# Patient Record
Sex: MALE | Race: WHITE | NOT HISPANIC OR LATINO | URBAN - METROPOLITAN AREA
[De-identification: names, ages, dates, MRNs, and addresses within clinical notes are randomized per-mention and may not be internally consistent; named-entity substitution may affect disease eponyms.]

---

## 2019-11-28 ENCOUNTER — INPATIENT (INPATIENT)
Facility: HOSPITAL | Age: 39
LOS: 4 days | Discharge: HOME | End: 2019-12-03
Attending: SURGERY | Admitting: SURGERY
Payer: MEDICAID

## 2019-11-28 VITALS
DIASTOLIC BLOOD PRESSURE: 69 MMHG | WEIGHT: 214.95 LBS | OXYGEN SATURATION: 100 % | HEIGHT: 69 IN | HEART RATE: 87 BPM | TEMPERATURE: 98 F | SYSTOLIC BLOOD PRESSURE: 131 MMHG | RESPIRATION RATE: 17 BRPM

## 2019-11-28 LAB
ALBUMIN SERPL ELPH-MCNC: 4.9 G/DL — SIGNIFICANT CHANGE UP (ref 3.3–5)
ALP SERPL-CCNC: 69 U/L — SIGNIFICANT CHANGE UP (ref 40–120)
ALT FLD-CCNC: 38 U/L — SIGNIFICANT CHANGE UP (ref 4–41)
ANION GAP SERPL CALC-SCNC: 16 MMO/L — HIGH (ref 7–14)
APTT BLD: 31.3 SEC — SIGNIFICANT CHANGE UP (ref 27.5–36.3)
AST SERPL-CCNC: 33 U/L — SIGNIFICANT CHANGE UP (ref 4–40)
BASE EXCESS BLDV CALC-SCNC: 2.7 MMOL/L — SIGNIFICANT CHANGE UP
BASOPHILS # BLD AUTO: 0.03 K/UL — SIGNIFICANT CHANGE UP (ref 0–0.2)
BASOPHILS NFR BLD AUTO: 0.2 % — SIGNIFICANT CHANGE UP (ref 0–2)
BILIRUB SERPL-MCNC: 0.7 MG/DL — SIGNIFICANT CHANGE UP (ref 0.2–1.2)
BLOOD GAS VENOUS - CREATININE: 1.12 MG/DL — SIGNIFICANT CHANGE UP (ref 0.5–1.3)
BLOOD GAS VENOUS - FIO2: 21 — SIGNIFICANT CHANGE UP
BUN SERPL-MCNC: 16 MG/DL — SIGNIFICANT CHANGE UP (ref 7–23)
CALCIUM SERPL-MCNC: 10.1 MG/DL — SIGNIFICANT CHANGE UP (ref 8.4–10.5)
CHLORIDE BLDV-SCNC: 100 MMOL/L — SIGNIFICANT CHANGE UP (ref 96–108)
CHLORIDE SERPL-SCNC: 99 MMOL/L — SIGNIFICANT CHANGE UP (ref 98–107)
CO2 SERPL-SCNC: 24 MMOL/L — SIGNIFICANT CHANGE UP (ref 22–31)
CREAT SERPL-MCNC: 1.04 MG/DL — SIGNIFICANT CHANGE UP (ref 0.5–1.3)
EOSINOPHIL # BLD AUTO: 0.01 K/UL — SIGNIFICANT CHANGE UP (ref 0–0.5)
EOSINOPHIL NFR BLD AUTO: 0.1 % — SIGNIFICANT CHANGE UP (ref 0–6)
GAS PNL BLDV: 136 MMOL/L — SIGNIFICANT CHANGE UP (ref 136–146)
GLUCOSE BLDV-MCNC: 142 MG/DL — HIGH (ref 70–99)
GLUCOSE SERPL-MCNC: 147 MG/DL — HIGH (ref 70–99)
HCO3 BLDV-SCNC: 24 MMOL/L — SIGNIFICANT CHANGE UP (ref 20–27)
HCT VFR BLD CALC: 48.6 % — SIGNIFICANT CHANGE UP (ref 39–50)
HCT VFR BLDV CALC: 52.1 % — HIGH (ref 39–51)
HGB BLD-MCNC: 17 G/DL — SIGNIFICANT CHANGE UP (ref 13–17)
HGB BLDV-MCNC: 17 G/DL — SIGNIFICANT CHANGE UP (ref 13–17)
IMM GRANULOCYTES NFR BLD AUTO: 0.5 % — SIGNIFICANT CHANGE UP (ref 0–1.5)
INR BLD: 1.11 — SIGNIFICANT CHANGE UP (ref 0.88–1.17)
LACTATE BLDV-MCNC: 3.8 MMOL/L — HIGH (ref 0.5–2)
LIDOCAIN IGE QN: 15.6 U/L — SIGNIFICANT CHANGE UP (ref 7–60)
LYMPHOCYTES # BLD AUTO: 0.8 K/UL — LOW (ref 1–3.3)
LYMPHOCYTES # BLD AUTO: 5.4 % — LOW (ref 13–44)
MCHC RBC-ENTMCNC: 31.4 PG — SIGNIFICANT CHANGE UP (ref 27–34)
MCHC RBC-ENTMCNC: 35 % — SIGNIFICANT CHANGE UP (ref 32–36)
MCV RBC AUTO: 89.7 FL — SIGNIFICANT CHANGE UP (ref 80–100)
MONOCYTES # BLD AUTO: 0.67 K/UL — SIGNIFICANT CHANGE UP (ref 0–0.9)
MONOCYTES NFR BLD AUTO: 4.5 % — SIGNIFICANT CHANGE UP (ref 2–14)
NEUTROPHILS # BLD AUTO: 13.26 K/UL — HIGH (ref 1.8–7.4)
NEUTROPHILS NFR BLD AUTO: 89.3 % — HIGH (ref 43–77)
NRBC # FLD: 0 K/UL — SIGNIFICANT CHANGE UP (ref 0–0)
PCO2 BLDV: 44 MMHG — SIGNIFICANT CHANGE UP (ref 41–51)
PH BLDV: 7.41 PH — SIGNIFICANT CHANGE UP (ref 7.32–7.43)
PLATELET # BLD AUTO: 389 K/UL — SIGNIFICANT CHANGE UP (ref 150–400)
PMV BLD: 9.6 FL — SIGNIFICANT CHANGE UP (ref 7–13)
PO2 BLDV: < 24 MMHG — LOW (ref 35–40)
POTASSIUM BLDV-SCNC: 4.2 MMOL/L — SIGNIFICANT CHANGE UP (ref 3.4–4.5)
POTASSIUM SERPL-MCNC: 4.8 MMOL/L — SIGNIFICANT CHANGE UP (ref 3.5–5.3)
POTASSIUM SERPL-SCNC: 4.8 MMOL/L — SIGNIFICANT CHANGE UP (ref 3.5–5.3)
PROT SERPL-MCNC: 8.5 G/DL — HIGH (ref 6–8.3)
PROTHROM AB SERPL-ACNC: 12.7 SEC — SIGNIFICANT CHANGE UP (ref 9.8–13.1)
RBC # BLD: 5.42 M/UL — SIGNIFICANT CHANGE UP (ref 4.2–5.8)
RBC # FLD: 12.3 % — SIGNIFICANT CHANGE UP (ref 10.3–14.5)
SAO2 % BLDV: 19.5 % — LOW (ref 60–85)
SODIUM SERPL-SCNC: 139 MMOL/L — SIGNIFICANT CHANGE UP (ref 135–145)
WBC # BLD: 14.84 K/UL — HIGH (ref 3.8–10.5)
WBC # FLD AUTO: 14.84 K/UL — HIGH (ref 3.8–10.5)

## 2019-11-28 PROCEDURE — 74019 RADEX ABDOMEN 2 VIEWS: CPT | Mod: 26

## 2019-11-28 RX ORDER — ACETAMINOPHEN 500 MG
975 TABLET ORAL ONCE
Refills: 0 | Status: COMPLETED | OUTPATIENT
Start: 2019-11-28 | End: 2019-11-28

## 2019-11-28 RX ORDER — SODIUM CHLORIDE 9 MG/ML
1000 INJECTION, SOLUTION INTRAVENOUS ONCE
Refills: 0 | Status: COMPLETED | OUTPATIENT
Start: 2019-11-28 | End: 2019-11-28

## 2019-11-28 RX ORDER — MORPHINE SULFATE 50 MG/1
4 CAPSULE, EXTENDED RELEASE ORAL ONCE
Refills: 0 | Status: DISCONTINUED | OUTPATIENT
Start: 2019-11-28 | End: 2019-11-28

## 2019-11-28 RX ORDER — PIPERACILLIN AND TAZOBACTAM 4; .5 G/20ML; G/20ML
3.38 INJECTION, POWDER, LYOPHILIZED, FOR SOLUTION INTRAVENOUS ONCE
Refills: 0 | Status: COMPLETED | OUTPATIENT
Start: 2019-11-28 | End: 2019-11-28

## 2019-11-28 RX ADMIN — Medication 975 MILLIGRAM(S): at 22:43

## 2019-11-28 RX ADMIN — SODIUM CHLORIDE 1000 MILLILITER(S): 9 INJECTION, SOLUTION INTRAVENOUS at 23:40

## 2019-11-28 RX ADMIN — MORPHINE SULFATE 4 MILLIGRAM(S): 50 CAPSULE, EXTENDED RELEASE ORAL at 22:43

## 2019-11-28 RX ADMIN — SODIUM CHLORIDE 1000 MILLILITER(S): 9 INJECTION, SOLUTION INTRAVENOUS at 22:43

## 2019-11-28 NOTE — ED ADULT NURSE NOTE - OBJECTIVE STATEMENT
Pt received in rm #3, 39Y M Aox3, ambulatory c/o intermittent abd pain severity 10/10 since sunday. Pt reports episodes of n/v. Pt denies any cp, sob, dizziness. Pt rectally 103.2F upon assessment, VS as charted. IV 18G Rt AC, labs sent, medicated as ordered. Pt respirations even and unlabored b/l. Pt appears in NAD, family at bedside, will continue to monitor.

## 2019-11-28 NOTE — ED PROVIDER NOTE - OBJECTIVE STATEMENT
39M no pmh no prior abdominal surgeries presents with a cc of lower abdominal pain a/w nausea, chills, firm bowel movements? no bleeding a/w radiation to tip of penis, got worse this evening in setting of having a bowel movement, no pain meds prior to ED arrival. Able to pass urine. Pain non radiating. Denies /v/f/cp/sob. Denies headache, syncope, lightheadedness, dizziness. Denies chest palpitations, abdominal pain. Denies dysuria, hematuria, hematochezia, BRBPR, tarry stools, diarrhea,

## 2019-11-28 NOTE — ED PROVIDER NOTE - CLINICAL SUMMARY MEDICAL DECISION MAKING FREE TEXT BOX
Ban PGY3: 39M no pmh no prior abdominal surgeries presents with a cc of lower abdominal pain a/w nausea, chills, firm bowel movements? no bleeding a/w radiation to tip of penis, got worse this evening in setting of having a bowel movement, no pain meds prior to ED arrival exam vss febrile 103.5 rectally, diffuse lower abdominal ttp c/f appendicitis vs colitis vs diverticulitis vs less likely uti will get labs xr abdomen ctap give apap ivf, abx as indicated, reassess

## 2019-11-28 NOTE — ED PROVIDER NOTE - PHYSICAL EXAMINATION
GEN APPEARANCE: WDWN, alert and cooperative, non-toxic appearing, warm to touch, uncomfortable appearing   HEAD: Atraumatic, normocephalic   EYES: PERRLa, EOMI, vision grossly intact.   EARS: Gross hearing intact.   NOSE: No nasal discharge, no external evidence of epistaxis.   NECK: Supple  CV: RRR, S1S2, no c/r/m/g. No cyanosis or pallor. Extremities warm, well perfused. Cap refill <2 seconds. No bruits.   LUNGS: CTAB. No wheezing. No rales. No rhonchi. No diminished breath sounds.   ABDOMEN: diffuse lower abdominal ttp.   MSK: Spine appears normal, no spine point tenderness. No CVA ttp. No joint erythema or tenderness. Normal muscular development. Pelvis stable.  EXTREMITIES: No peripheral edema. No obvious joint or bony deformity.  NEURO: Alert, follows commands. Weight bearing normal. Speech normal. Sensation and motor normal x4 extremities.   SKIN: Normal color for race, warm, dry and intact. No evidence of rash.  PSYCH: Normal mood and affect.   Exam (Male): Un-Circumcised penis, external anatomy appears normal, no e/o priapism, no e/o trauma. No testicular swelling, erythema, or tenderness. No urethral discharge or bleeding. No hernia.   EXAM WITH: Tito SMITH

## 2019-11-28 NOTE — ED PROVIDER NOTE - ATTENDING CONTRIBUTION TO CARE
40yo M with no past medical or surgical hx p/w 4-5 days of lower abd pain, nausea, dysuria at times, and scant soft stools.  Found to be febrile to 103F in ED.  No testicular pain    General: Patient in no apparent distress, AAO x 3  Skin: Dry and intact  HEENT: Oral mucosa moist. No pharyngeal exudates or tonsillar enlargement  Eyes: Conjunctiva normal  Cardiac: Regular rhythm and rate. No edema  Respiratory: Lungs clear b/l and symmetric. No respiratory distress  Gastrointestinal: Abdomen soft, nondistended, lower abdominal ttp with guarding, no inguinal hernias b/l, no testicular ttp.   Musculoskeletal: Moves all extremities spontaneously  Neurological: alert and oriented to person, place and time  Psychiatric: Cooperative    a/p  concern for intra-abdominal infection with fever and exam  pre-op labs, IV zosyn, CT abd, pain meds, ua

## 2019-11-28 NOTE — ED ADULT TRIAGE NOTE - CHIEF COMPLAINT QUOTE
Pt arrives to ED  from home c/o lower abd pain above pubic bone with N/V since Sunday which has been worsening.  Pt c/o constipation since Sunday with very little stool during attempted bowel movements.  Pt denies  issues until today and states he is not urinating as much as usual.  Pain feels as if pain is "stabbing" and comes in waves.  Pt appears uncomfortable in triage.

## 2019-11-29 DIAGNOSIS — K57.20 DIVERTICULITIS OF LARGE INTESTINE WITH PERFORATION AND ABSCESS WITHOUT BLEEDING: ICD-10-CM

## 2019-11-29 LAB
ANION GAP SERPL CALC-SCNC: 11 MMO/L — SIGNIFICANT CHANGE UP (ref 7–14)
APPEARANCE UR: CLEAR — SIGNIFICANT CHANGE UP
BACTERIA # UR AUTO: NEGATIVE — SIGNIFICANT CHANGE UP
BASE EXCESS BLDV CALC-SCNC: -0.1 MMOL/L — SIGNIFICANT CHANGE UP
BILIRUB UR-MCNC: NEGATIVE — SIGNIFICANT CHANGE UP
BLD GP AB SCN SERPL QL: NEGATIVE — SIGNIFICANT CHANGE UP
BLD GP AB SCN SERPL QL: NEGATIVE — SIGNIFICANT CHANGE UP
BLOOD GAS VENOUS - CREATININE: 1.08 MG/DL — SIGNIFICANT CHANGE UP (ref 0.5–1.3)
BLOOD GAS VENOUS - FIO2: 21 — SIGNIFICANT CHANGE UP
BLOOD UR QL VISUAL: NEGATIVE — SIGNIFICANT CHANGE UP
BUN SERPL-MCNC: 13 MG/DL — SIGNIFICANT CHANGE UP (ref 7–23)
CALCIUM SERPL-MCNC: 9.1 MG/DL — SIGNIFICANT CHANGE UP (ref 8.4–10.5)
CHLORIDE BLDV-SCNC: 103 MMOL/L — SIGNIFICANT CHANGE UP (ref 96–108)
CHLORIDE SERPL-SCNC: 101 MMOL/L — SIGNIFICANT CHANGE UP (ref 98–107)
CO2 SERPL-SCNC: 24 MMOL/L — SIGNIFICANT CHANGE UP (ref 22–31)
COLOR SPEC: YELLOW — SIGNIFICANT CHANGE UP
CREAT SERPL-MCNC: 1 MG/DL — SIGNIFICANT CHANGE UP (ref 0.5–1.3)
GAS PNL BLDV: 136 MMOL/L — SIGNIFICANT CHANGE UP (ref 136–146)
GLUCOSE BLDV-MCNC: 124 MG/DL — HIGH (ref 70–99)
GLUCOSE SERPL-MCNC: 120 MG/DL — HIGH (ref 70–99)
GLUCOSE UR-MCNC: NEGATIVE — SIGNIFICANT CHANGE UP
HCO3 BLDV-SCNC: 25 MMOL/L — SIGNIFICANT CHANGE UP (ref 20–27)
HCT VFR BLD CALC: 41 % — SIGNIFICANT CHANGE UP (ref 39–50)
HCT VFR BLDV CALC: 46.5 % — SIGNIFICANT CHANGE UP (ref 39–51)
HGB BLD-MCNC: 14.1 G/DL — SIGNIFICANT CHANGE UP (ref 13–17)
HGB BLDV-MCNC: 15.2 G/DL — SIGNIFICANT CHANGE UP (ref 13–17)
HYALINE CASTS # UR AUTO: SIGNIFICANT CHANGE UP
KETONES UR-MCNC: SIGNIFICANT CHANGE UP
LACTATE BLDV-MCNC: 2.6 MMOL/L — HIGH (ref 0.5–2)
LEUKOCYTE ESTERASE UR-ACNC: NEGATIVE — SIGNIFICANT CHANGE UP
MAGNESIUM SERPL-MCNC: 1.8 MG/DL — SIGNIFICANT CHANGE UP (ref 1.6–2.6)
MCHC RBC-ENTMCNC: 30.7 PG — SIGNIFICANT CHANGE UP (ref 27–34)
MCHC RBC-ENTMCNC: 34.4 % — SIGNIFICANT CHANGE UP (ref 32–36)
MCV RBC AUTO: 89.1 FL — SIGNIFICANT CHANGE UP (ref 80–100)
NITRITE UR-MCNC: NEGATIVE — SIGNIFICANT CHANGE UP
NRBC # FLD: 0 K/UL — SIGNIFICANT CHANGE UP (ref 0–0)
PCO2 BLDV: 36 MMHG — LOW (ref 41–51)
PH BLDV: 7.43 PH — SIGNIFICANT CHANGE UP (ref 7.32–7.43)
PH UR: 6.5 — SIGNIFICANT CHANGE UP (ref 5–8)
PHOSPHATE SERPL-MCNC: 3.4 MG/DL — SIGNIFICANT CHANGE UP (ref 2.5–4.5)
PLATELET # BLD AUTO: 319 K/UL — SIGNIFICANT CHANGE UP (ref 150–400)
PMV BLD: 10 FL — SIGNIFICANT CHANGE UP (ref 7–13)
PO2 BLDV: 109 MMHG — HIGH (ref 35–40)
POTASSIUM BLDV-SCNC: 4 MMOL/L — SIGNIFICANT CHANGE UP (ref 3.4–4.5)
POTASSIUM SERPL-MCNC: 3.9 MMOL/L — SIGNIFICANT CHANGE UP (ref 3.5–5.3)
POTASSIUM SERPL-SCNC: 3.9 MMOL/L — SIGNIFICANT CHANGE UP (ref 3.5–5.3)
PROT UR-MCNC: 50 — SIGNIFICANT CHANGE UP
RBC # BLD: 4.6 M/UL — SIGNIFICANT CHANGE UP (ref 4.2–5.8)
RBC # FLD: 12.7 % — SIGNIFICANT CHANGE UP (ref 10.3–14.5)
RBC CASTS # UR COMP ASSIST: SIGNIFICANT CHANGE UP (ref 0–?)
RH IG SCN BLD-IMP: POSITIVE — SIGNIFICANT CHANGE UP
RH IG SCN BLD-IMP: POSITIVE — SIGNIFICANT CHANGE UP
SAO2 % BLDV: 98.6 % — HIGH (ref 60–85)
SODIUM SERPL-SCNC: 136 MMOL/L — SIGNIFICANT CHANGE UP (ref 135–145)
SP GR SPEC: > 1.04 — HIGH (ref 1–1.04)
SQUAMOUS # UR AUTO: SIGNIFICANT CHANGE UP
UROBILINOGEN FLD QL: SIGNIFICANT CHANGE UP
WBC # BLD: 21.4 K/UL — HIGH (ref 3.8–10.5)
WBC # FLD AUTO: 21.4 K/UL — HIGH (ref 3.8–10.5)
WBC UR QL: SIGNIFICANT CHANGE UP (ref 0–?)

## 2019-11-29 PROCEDURE — 99232 SBSQ HOSP IP/OBS MODERATE 35: CPT

## 2019-11-29 PROCEDURE — 74177 CT ABD & PELVIS W/CONTRAST: CPT | Mod: 26

## 2019-11-29 RX ORDER — MORPHINE SULFATE 50 MG/1
4 CAPSULE, EXTENDED RELEASE ORAL ONCE
Refills: 0 | Status: DISCONTINUED | OUTPATIENT
Start: 2019-11-29 | End: 2019-11-29

## 2019-11-29 RX ORDER — ENOXAPARIN SODIUM 100 MG/ML
40 INJECTION SUBCUTANEOUS DAILY
Refills: 0 | Status: DISCONTINUED | OUTPATIENT
Start: 2019-11-29 | End: 2019-12-03

## 2019-11-29 RX ORDER — PIPERACILLIN AND TAZOBACTAM 4; .5 G/20ML; G/20ML
3.38 INJECTION, POWDER, LYOPHILIZED, FOR SOLUTION INTRAVENOUS EVERY 8 HOURS
Refills: 0 | Status: DISCONTINUED | OUTPATIENT
Start: 2019-11-29 | End: 2019-12-03

## 2019-11-29 RX ORDER — SODIUM CHLORIDE 9 MG/ML
1000 INJECTION, SOLUTION INTRAVENOUS ONCE
Refills: 0 | Status: COMPLETED | OUTPATIENT
Start: 2019-11-29 | End: 2019-11-29

## 2019-11-29 RX ORDER — PIPERACILLIN AND TAZOBACTAM 4; .5 G/20ML; G/20ML
3.38 INJECTION, POWDER, LYOPHILIZED, FOR SOLUTION INTRAVENOUS ONCE
Refills: 0 | Status: DISCONTINUED | OUTPATIENT
Start: 2019-11-29 | End: 2019-11-29

## 2019-11-29 RX ORDER — SODIUM CHLORIDE 9 MG/ML
1000 INJECTION, SOLUTION INTRAVENOUS
Refills: 0 | Status: DISCONTINUED | OUTPATIENT
Start: 2019-11-29 | End: 2019-11-29

## 2019-11-29 RX ORDER — HYDROMORPHONE HYDROCHLORIDE 2 MG/ML
0.5 INJECTION INTRAMUSCULAR; INTRAVENOUS; SUBCUTANEOUS EVERY 4 HOURS
Refills: 0 | Status: DISCONTINUED | OUTPATIENT
Start: 2019-11-29 | End: 2019-11-30

## 2019-11-29 RX ORDER — ACETAMINOPHEN 500 MG
1000 TABLET ORAL EVERY 6 HOURS
Refills: 0 | Status: COMPLETED | OUTPATIENT
Start: 2019-11-29 | End: 2019-11-29

## 2019-11-29 RX ORDER — SODIUM CHLORIDE 9 MG/ML
1000 INJECTION, SOLUTION INTRAVENOUS ONCE
Refills: 0 | Status: DISCONTINUED | OUTPATIENT
Start: 2019-11-29 | End: 2019-11-29

## 2019-11-29 RX ORDER — SODIUM CHLORIDE 9 MG/ML
500 INJECTION, SOLUTION INTRAVENOUS ONCE
Refills: 0 | Status: COMPLETED | OUTPATIENT
Start: 2019-11-29 | End: 2019-11-29

## 2019-11-29 RX ORDER — SODIUM CHLORIDE 9 MG/ML
1000 INJECTION, SOLUTION INTRAVENOUS
Refills: 0 | Status: DISCONTINUED | OUTPATIENT
Start: 2019-11-29 | End: 2019-11-30

## 2019-11-29 RX ORDER — MAGNESIUM SULFATE 500 MG/ML
2 VIAL (ML) INJECTION ONCE
Refills: 0 | Status: COMPLETED | OUTPATIENT
Start: 2019-11-29 | End: 2019-11-29

## 2019-11-29 RX ADMIN — Medication 400 MILLIGRAM(S): at 11:28

## 2019-11-29 RX ADMIN — HYDROMORPHONE HYDROCHLORIDE 0.5 MILLIGRAM(S): 2 INJECTION INTRAMUSCULAR; INTRAVENOUS; SUBCUTANEOUS at 22:55

## 2019-11-29 RX ADMIN — MORPHINE SULFATE 4 MILLIGRAM(S): 50 CAPSULE, EXTENDED RELEASE ORAL at 02:07

## 2019-11-29 RX ADMIN — HYDROMORPHONE HYDROCHLORIDE 0.5 MILLIGRAM(S): 2 INJECTION INTRAMUSCULAR; INTRAVENOUS; SUBCUTANEOUS at 12:54

## 2019-11-29 RX ADMIN — PIPERACILLIN AND TAZOBACTAM 25 GRAM(S): 4; .5 INJECTION, POWDER, LYOPHILIZED, FOR SOLUTION INTRAVENOUS at 15:19

## 2019-11-29 RX ADMIN — Medication 1000 MILLIGRAM(S): at 06:39

## 2019-11-29 RX ADMIN — PIPERACILLIN AND TAZOBACTAM 25 GRAM(S): 4; .5 INJECTION, POWDER, LYOPHILIZED, FOR SOLUTION INTRAVENOUS at 07:09

## 2019-11-29 RX ADMIN — HYDROMORPHONE HYDROCHLORIDE 0.5 MILLIGRAM(S): 2 INJECTION INTRAMUSCULAR; INTRAVENOUS; SUBCUTANEOUS at 09:01

## 2019-11-29 RX ADMIN — Medication 400 MILLIGRAM(S): at 05:51

## 2019-11-29 RX ADMIN — PIPERACILLIN AND TAZOBACTAM 3.38 GRAM(S): 4; .5 INJECTION, POWDER, LYOPHILIZED, FOR SOLUTION INTRAVENOUS at 00:40

## 2019-11-29 RX ADMIN — SODIUM CHLORIDE 500 MILLILITER(S): 9 INJECTION, SOLUTION INTRAVENOUS at 14:00

## 2019-11-29 RX ADMIN — HYDROMORPHONE HYDROCHLORIDE 0.5 MILLIGRAM(S): 2 INJECTION INTRAMUSCULAR; INTRAVENOUS; SUBCUTANEOUS at 13:24

## 2019-11-29 RX ADMIN — SODIUM CHLORIDE 1000 MILLILITER(S): 9 INJECTION, SOLUTION INTRAVENOUS at 01:46

## 2019-11-29 RX ADMIN — HYDROMORPHONE HYDROCHLORIDE 0.5 MILLIGRAM(S): 2 INJECTION INTRAMUSCULAR; INTRAVENOUS; SUBCUTANEOUS at 04:09

## 2019-11-29 RX ADMIN — Medication 975 MILLIGRAM(S): at 02:07

## 2019-11-29 RX ADMIN — PIPERACILLIN AND TAZOBACTAM 200 GRAM(S): 4; .5 INJECTION, POWDER, LYOPHILIZED, FOR SOLUTION INTRAVENOUS at 00:00

## 2019-11-29 RX ADMIN — HYDROMORPHONE HYDROCHLORIDE 0.5 MILLIGRAM(S): 2 INJECTION INTRAMUSCULAR; INTRAVENOUS; SUBCUTANEOUS at 08:30

## 2019-11-29 RX ADMIN — SODIUM CHLORIDE 1000 MILLILITER(S): 9 INJECTION, SOLUTION INTRAVENOUS at 02:40

## 2019-11-29 RX ADMIN — HYDROMORPHONE HYDROCHLORIDE 0.5 MILLIGRAM(S): 2 INJECTION INTRAMUSCULAR; INTRAVENOUS; SUBCUTANEOUS at 23:22

## 2019-11-29 RX ADMIN — Medication 1000 MILLIGRAM(S): at 12:58

## 2019-11-29 RX ADMIN — MORPHINE SULFATE 4 MILLIGRAM(S): 50 CAPSULE, EXTENDED RELEASE ORAL at 01:02

## 2019-11-29 RX ADMIN — HYDROMORPHONE HYDROCHLORIDE 0.5 MILLIGRAM(S): 2 INJECTION INTRAMUSCULAR; INTRAVENOUS; SUBCUTANEOUS at 19:27

## 2019-11-29 RX ADMIN — Medication 50 GRAM(S): at 12:56

## 2019-11-29 RX ADMIN — Medication 1000 MILLIGRAM(S): at 18:44

## 2019-11-29 RX ADMIN — Medication 400 MILLIGRAM(S): at 23:18

## 2019-11-29 RX ADMIN — Medication 1000 MILLIGRAM(S): at 23:48

## 2019-11-29 RX ADMIN — Medication 400 MILLIGRAM(S): at 18:14

## 2019-11-29 RX ADMIN — PIPERACILLIN AND TAZOBACTAM 25 GRAM(S): 4; .5 INJECTION, POWDER, LYOPHILIZED, FOR SOLUTION INTRAVENOUS at 23:19

## 2019-11-29 RX ADMIN — SODIUM CHLORIDE 135 MILLILITER(S): 9 INJECTION, SOLUTION INTRAVENOUS at 12:56

## 2019-11-29 RX ADMIN — SODIUM CHLORIDE 135 MILLILITER(S): 9 INJECTION, SOLUTION INTRAVENOUS at 04:09

## 2019-11-29 RX ADMIN — HYDROMORPHONE HYDROCHLORIDE 0.5 MILLIGRAM(S): 2 INJECTION INTRAMUSCULAR; INTRAVENOUS; SUBCUTANEOUS at 18:57

## 2019-11-29 NOTE — H&P ADULT - NSHPLABSRESULTS_GEN_ALL_CORE
CBC (11-28 @ 22:48)                          17.0                     14.84<H>  )--------------(  389        89.3<H>% Neuts, 5.4<L>% Lymphs, ANC: 13.26<H>                          48.6      BMP (11-28 @ 22:48)       139     |  99      |  16    			Ca++ --      Ca 10.1         ---------------------------------( 147<H>		Mg --           4.8     |  24      |  1.04  			Ph --        LFTs (11-28 @ 22:48)      TPro 8.5<H> / Alb 4.9 / TBili 0.7 / DBili -- / AST 33 / ALT 38 / AlkPhos 69    Coags (11-28 @ 22:48)  aPTT 31.3 / INR 1.11 / PT 12.7        VBG (11-29 @ 01:00)     7.43 / 36<L> / 109<H> / 25 / -0.1 / 98.6<H>%      Lactate: 2.6<H>  VBG (11-28 @ 22:48)     7.41 / 44 / < 24<L> / 24 / 2.7 / 19.5<L>%      Lactate: 3.8<H>    Urinalysis (11-29 @ 01:00):     Color: YELLOW / Appearance: CLEAR / SG: > 1.040<H> / pH: 6.5 / Gluc: NEGATIVE / Ketones: SMALL / Bili: NEGATIVE / Urobili: TRACE / Protein :50 / Nitrites: NEGATIVE / Leuk.Est: NEGATIVE / RBC: 3-5 / WBC: 3-5 / Sq Epi: OCC / Non Sq Epi:  / Bacteria NEGATIVE           CT A/P:  PROCEDURE:   CT of the Abdomen and Pelvis was performed with intravenous contrast.   Intravenous contrast: 90 ml Omnipaque 350. 10 ml discarded.  Oral contrast: None.  Sagittal and coronal reformats were performed.    FINDINGS:    LOWER CHEST: Subsegmental atelectasis. Mitral and calcification.    LIVER: Diffuse low-attenuation. No obvious lesion.  BILE DUCTS: Normal caliber.  GALLBLADDER: No significant gallbladder wall edema.  SPLEEN: Within normal limits.  PANCREAS: Within normal limits.    ADRENALS: Within normal limits.  KIDNEYS/URETERS: No hydronephrosis, hydroureter or significant   perinephric stranding.  BLADDER: Partially distended.  REPRODUCTIVE ORGANS: Within normal limits.    BOWEL/PERITONEUM: No bowel obstruction. Colon diverticulosis. Focal   sigmoid colon wall thickening with peridiverticular inflammatory change   and trace free fluid, indicating diverticulitis. Foci of intraperitoneal   air adjacent to the inflamed sigmoid colon. No organized organized fluid   collection. Prominent wall of a few small bowel loops in the lower   abdomen, likely reactive/partial distention. Borderline appendix, felt to   be reactive.  VESSELS: Atherosclerosis.  RETROPERITONEUM/LYMPH NODES: Subcentimeter lymph nodes without   lymphadenopathy.    ABDOMINAL WALL: Small fat-containing umbilical hernia.  BONES: Degenerative changes of the spine.    IMPRESSION:     Sigmoid colon diverticulitis complicated by extraperitoneal air. Trace   free fluid without organized fluid collection or bowel obstruction.   Recommend correlation with colonoscopy to exclude potential underlying   neoplasm, following resolution of acute episode.    Dr. Martínez discussed these findings with Dr. Cardoza on 11/29/2019 12:58 AM,   with read back.

## 2019-11-29 NOTE — PROGRESS NOTE ADULT - SUBJECTIVE AND OBJECTIVE BOX
Morning Surgical Progress Note  Patient is a 39y old  Male who presents with a chief complaint of sigmoid diverticulitis (29 Nov 2019 03:59)      SUBJECTIVE: Patient seen and examined at bedside with surgical team, patient complains of abdominal pain which has improved from yesterday. He states he has chills, he denies fevers. Positive BM    PAST MEDICAL & SURGICAL HISTORY:  No pertinent past medical history  No significant past surgical history: ankle surgery with hardware L    FAMILY HISTORY:    REVIEW OF SYSTEMS:  CONSTITUTIONAL: No weakness  EYES/ENT: No visual changes;  No vertigo or throat pain   NECK: No pain or stiffness  RESPIRATORY: No cough, wheezing, hemoptysis; No shortness of breath  CARDIOVASCULAR: No chest pain or palpitations  GASTROINTESTINAL: No nausea, vomiting, or hematemesis; No diarrhea or constipation. No melena or hematochezia.  GENITOURINARY: No dysuria, frequency or hematuria  NEUROLOGICAL: No numbness or weakness  SKIN: No itching, rashes    Vital Signs Last 24 Hrs  T(C): 36.8 (29 Nov 2019 09:11), Max: 39.6 (28 Nov 2019 22:59)  T(F): 98.2 (29 Nov 2019 09:11), Max: 103.2 (28 Nov 2019 22:59)  HR: 84 (29 Nov 2019 09:11) (84 - 105)  BP: 125/77 (29 Nov 2019 09:11) (125/77 - 143/81)  RR: 18 (29 Nov 2019 09:11) (16 - 18)  SpO2: 97% (29 Nov 2019 09:11) (95% - 100%)I&O's Detail    28 Nov 2019 07:01  -  29 Nov 2019 07:00  --------------------------------------------------------  IN:    IV PiggyBack: 100 mL    lactated ringers.: 270 mL  Total IN: 370 mL  OUT:    Voided: 300 mL  Total OUT: 300 mL    Total NET: 70 mL      29 Nov 2019 07:01  -  29 Nov 2019 12:56  --------------------------------------------------------  IN:  Total IN: 0 mL    OUT:  Total OUT: 0 mL  Total NET: 0 mL    Medications  MEDICATIONS  (STANDING):  acetaminophen  IVPB .. 1000 milliGRAM(s) IV Intermittent every 6 hours  enoxaparin Injectable 40 milliGRAM(s) SubCutaneous daily  lactated ringers. 1000 milliLiter(s) (135 mL/Hr) IV Continuous <Continuous>  magnesium sulfate  IVPB 2 Gram(s) IV Intermittent once  piperacillin/tazobactam IVPB.. 3.375 Gram(s) IV Intermittent every 8 hours  MEDICATIONS  (PRN):  HYDROmorphone  Injectable 0.5 milliGRAM(s) IV Push every 4 hours PRN Moderate Pain (4 - 6)    Physical Exam  Constitutional: A&Ox3, NAD, appears comfortable  Eyes: Scleras clear, PERRLA/ EOMI, Gross vision intact  Gastrointestinal: Soft tender suprapubic area, no rebound no guarding, no scars, no rashes, no lesions  Extremities: Moving all extremities, no edema  Skin: No Rashes, Hematoma, Ecchymosis    LABS:                        14.1   21.40 )-----------( 319      ( 29 Nov 2019 07:04 )             41.0     11-29    136  |  101  |  13  ----------------------------<  120<H>  3.9   |  24  |  1.00    Ca    9.1      29 Nov 2019 07:04  Phos  3.4     11-29  Mg     1.8     11-29  TPro  8.5<H>  /  Alb  4.9  /  TBili  0.7  /  DBili  x   /  AST  33  /  ALT  38  /  AlkPhos  69  11-28  PT/INR - ( 28 Nov 2019 22:48 )   PT: 12.7 SEC;   INR: 1.11     PTT - ( 28 Nov 2019 22:48 )  PTT:31.3 SEC  LIVER FUNCTIONS - ( 28 Nov 2019 22:48 )  Alb: 4.9 g/dL / Pro: 8.5 g/dL / ALK PHOS: 69 u/L / ALT: 38 u/L / AST: 33 u/L / GGT: x         Urinalysis Basic - ( 29 Nov 2019 01:00 )  Color: YELLOW / Appearance: CLEAR / SG: > 1.040 / pH: 6.5  Gluc: NEGATIVE / Ketone: SMALL  / Bili: NEGATIVE / Urobili: TRACE   Blood: NEGATIVE / Protein: 50 / Nitrite: NEGATIVE   Leuk Esterase: NEGATIVE / RBC: 3-5 / WBC 3-5   Sq Epi: OCC / Non Sq Epi: x / Bacteria: NEGATIVE      ABO Interpretation: A (11-29-19 @ 06:49)  ABO Interpretation: A (11-28-19 @ 22:31)

## 2019-11-29 NOTE — H&P ADULT - ASSESSMENT
Patient is a 39 year old male with sigmoid diverticulitis and a foci of intraperitoneal air.    -NPO  -IVFs for hydration  -Zosyn for abx coverage  -Blood cultures and urine culture are pending  -Pain control  -Serial abdominal exams  -Lovenox for VTE px    Greg GALARZA-Team Surgery #[id number]

## 2019-11-29 NOTE — H&P ADULT - NSHPPHYSICALEXAM_GEN_ALL_CORE
General: A&Ox3, NAD.  Neuro: CN II-XII intact, motor and sensory - grossly intact w/ no focal deficits.  HEENT: Normocephalic, atraumatic  Respiratory: no accessory muscle use, unlabored breathing.   CVS: tachycardic  Abdomen: Soft, moderately distended with TTP in the midline lower abdomen.  No involuntary guarding.  MSK: Intact ROM.

## 2019-11-29 NOTE — H&P ADULT - HISTORY OF PRESENT ILLNESS
Patient is a 39 year old male with no PMH who is presenting with abdominal pain that started three days ago and its located in the lower midline abdomen.  The pain went away this morning, but then came back worse than before after having a BM.  He has been having fevers and chills at home and some nausea but no vomiting.  He has never had these symptoms before and he has never had a colonoscopy.  In the ED he came in with a temperature of 103.2, HR of 101, WBC of 14.8, and a lactate of 3.8.  After 1L bolus, lactate was 2.6 and he then received a second liter.      CT scan showed sigmoid diverticulitis with foci of intraperitoneal air but no fluid collection.

## 2019-11-29 NOTE — PROGRESS NOTE ADULT - ASSESSMENT
39 year old male with sigmoid diverticulitis and a foci of intraperitoneal air with leukocytosis    -NPO  -IVFs for hydration, change to maintenance fluid tomorrow  -Zosyn for abx coverage  -Blood cultures pending  -UCx negative  -Pain control  -Serial abdominal exams  -Lovenox for VTE px    B-Team Surgery #[id number]

## 2019-11-29 NOTE — ED ADULT NURSE REASSESSMENT NOTE - NS ED NURSE REASSESS COMMENT FT1
Received report from shan BILLINGS. Pt Aox3, 2nd liter LR hanging as ordered. Pt respirations even and unlabored b/l. Pt pending surg consult, family at bedside, appears in NAD, will continue to monitor.

## 2019-11-30 LAB
ANION GAP SERPL CALC-SCNC: 11 MMO/L — SIGNIFICANT CHANGE UP (ref 7–14)
BACTERIA UR CULT: SIGNIFICANT CHANGE UP
BUN SERPL-MCNC: 8 MG/DL — SIGNIFICANT CHANGE UP (ref 7–23)
CALCIUM SERPL-MCNC: 9 MG/DL — SIGNIFICANT CHANGE UP (ref 8.4–10.5)
CHLORIDE SERPL-SCNC: 99 MMOL/L — SIGNIFICANT CHANGE UP (ref 98–107)
CO2 SERPL-SCNC: 25 MMOL/L — SIGNIFICANT CHANGE UP (ref 22–31)
CREAT SERPL-MCNC: 0.99 MG/DL — SIGNIFICANT CHANGE UP (ref 0.5–1.3)
GLUCOSE SERPL-MCNC: 112 MG/DL — HIGH (ref 70–99)
HCT VFR BLD CALC: 42.4 % — SIGNIFICANT CHANGE UP (ref 39–50)
HGB BLD-MCNC: 14.2 G/DL — SIGNIFICANT CHANGE UP (ref 13–17)
MAGNESIUM SERPL-MCNC: 1.9 MG/DL — SIGNIFICANT CHANGE UP (ref 1.6–2.6)
MCHC RBC-ENTMCNC: 30.4 PG — SIGNIFICANT CHANGE UP (ref 27–34)
MCHC RBC-ENTMCNC: 33.5 % — SIGNIFICANT CHANGE UP (ref 32–36)
MCV RBC AUTO: 90.8 FL — SIGNIFICANT CHANGE UP (ref 80–100)
NRBC # FLD: 0 K/UL — SIGNIFICANT CHANGE UP (ref 0–0)
PHOSPHATE SERPL-MCNC: 2 MG/DL — LOW (ref 2.5–4.5)
PLATELET # BLD AUTO: 291 K/UL — SIGNIFICANT CHANGE UP (ref 150–400)
PMV BLD: 9.5 FL — SIGNIFICANT CHANGE UP (ref 7–13)
POTASSIUM SERPL-MCNC: 3.9 MMOL/L — SIGNIFICANT CHANGE UP (ref 3.5–5.3)
POTASSIUM SERPL-SCNC: 3.9 MMOL/L — SIGNIFICANT CHANGE UP (ref 3.5–5.3)
RBC # BLD: 4.67 M/UL — SIGNIFICANT CHANGE UP (ref 4.2–5.8)
RBC # FLD: 12.9 % — SIGNIFICANT CHANGE UP (ref 10.3–14.5)
SODIUM SERPL-SCNC: 135 MMOL/L — SIGNIFICANT CHANGE UP (ref 135–145)
SPECIMEN SOURCE: SIGNIFICANT CHANGE UP
WBC # BLD: 18.46 K/UL — HIGH (ref 3.8–10.5)
WBC # FLD AUTO: 18.46 K/UL — HIGH (ref 3.8–10.5)

## 2019-11-30 RX ORDER — ACETAMINOPHEN 500 MG
975 TABLET ORAL EVERY 6 HOURS
Refills: 0 | Status: DISCONTINUED | OUTPATIENT
Start: 2019-11-30 | End: 2019-12-03

## 2019-11-30 RX ORDER — KETOROLAC TROMETHAMINE 30 MG/ML
15 SYRINGE (ML) INJECTION EVERY 6 HOURS
Refills: 0 | Status: DISCONTINUED | OUTPATIENT
Start: 2019-11-30 | End: 2019-11-30

## 2019-11-30 RX ORDER — KETOROLAC TROMETHAMINE 30 MG/ML
15 SYRINGE (ML) INJECTION EVERY 6 HOURS
Refills: 0 | Status: DISCONTINUED | OUTPATIENT
Start: 2019-11-30 | End: 2019-12-02

## 2019-11-30 RX ADMIN — Medication 15 MILLIGRAM(S): at 23:38

## 2019-11-30 RX ADMIN — HYDROMORPHONE HYDROCHLORIDE 0.5 MILLIGRAM(S): 2 INJECTION INTRAMUSCULAR; INTRAVENOUS; SUBCUTANEOUS at 07:01

## 2019-11-30 RX ADMIN — Medication 15 MILLIGRAM(S): at 23:23

## 2019-11-30 RX ADMIN — Medication 15 MILLIGRAM(S): at 17:15

## 2019-11-30 RX ADMIN — Medication 975 MILLIGRAM(S): at 22:32

## 2019-11-30 RX ADMIN — Medication 15 MILLIGRAM(S): at 18:11

## 2019-11-30 RX ADMIN — ENOXAPARIN SODIUM 40 MILLIGRAM(S): 100 INJECTION SUBCUTANEOUS at 13:30

## 2019-11-30 RX ADMIN — PIPERACILLIN AND TAZOBACTAM 25 GRAM(S): 4; .5 INJECTION, POWDER, LYOPHILIZED, FOR SOLUTION INTRAVENOUS at 17:04

## 2019-11-30 RX ADMIN — Medication 15 MILLIGRAM(S): at 11:00

## 2019-11-30 RX ADMIN — PIPERACILLIN AND TAZOBACTAM 25 GRAM(S): 4; .5 INJECTION, POWDER, LYOPHILIZED, FOR SOLUTION INTRAVENOUS at 07:03

## 2019-11-30 RX ADMIN — HYDROMORPHONE HYDROCHLORIDE 0.5 MILLIGRAM(S): 2 INJECTION INTRAMUSCULAR; INTRAVENOUS; SUBCUTANEOUS at 03:27

## 2019-11-30 RX ADMIN — HYDROMORPHONE HYDROCHLORIDE 0.5 MILLIGRAM(S): 2 INJECTION INTRAMUSCULAR; INTRAVENOUS; SUBCUTANEOUS at 02:57

## 2019-11-30 RX ADMIN — Medication 15 MILLIGRAM(S): at 10:14

## 2019-11-30 RX ADMIN — PIPERACILLIN AND TAZOBACTAM 25 GRAM(S): 4; .5 INJECTION, POWDER, LYOPHILIZED, FOR SOLUTION INTRAVENOUS at 23:24

## 2019-11-30 RX ADMIN — Medication 975 MILLIGRAM(S): at 14:40

## 2019-11-30 RX ADMIN — Medication 975 MILLIGRAM(S): at 21:42

## 2019-11-30 RX ADMIN — Medication 975 MILLIGRAM(S): at 13:54

## 2019-11-30 RX ADMIN — HYDROMORPHONE HYDROCHLORIDE 0.5 MILLIGRAM(S): 2 INJECTION INTRAMUSCULAR; INTRAVENOUS; SUBCUTANEOUS at 07:31

## 2019-11-30 RX ADMIN — Medication 63.75 MILLIMOLE(S): at 09:52

## 2019-11-30 NOTE — PROGRESS NOTE ADULT - SUBJECTIVE AND OBJECTIVE BOX
B Team Surgery Progress Note  Patient is a 39y old male who presents with a chief complaint of sigmoid diverticulitis (29 Nov 2019 03:59)    SUBJECTIVE: Patient seen and examined at bedside with surgical team, patient complains of minimal abdominal pain which has improved from yesterday. No other complaints    Physical Exam  Constitutional: A&Ox3, NAD  Neuro: awake, alert  Pulm: comfortable   Gastrointestinal: Soft, some ttp in LLQ, no rebound or guarding  Extremities: Moving all extremities    Vital Signs Last 24 Hrs  T(C): 36.5 (30 Nov 2019 06:57), Max: 37 (29 Nov 2019 21:18)  T(F): 97.7 (30 Nov 2019 06:57), Max: 98.6 (29 Nov 2019 21:18)  HR: 88 (30 Nov 2019 07:18) (78 - 95)  BP: 141/82 (30 Nov 2019 07:18) (115/73 - 147/86)  BP(mean): --  RR: 17 (30 Nov 2019 07:18) (16 - 18)  SpO2: 99% (30 Nov 2019 07:18) (94% - 99%)    I&O's Detail    29 Nov 2019 07:01  -  30 Nov 2019 07:00  --------------------------------------------------------  IN:    IV PiggyBack: 300 mL    lactated ringers.: 1350 mL  Total IN: 1650 mL    OUT:    Voided: 1025 mL  Total OUT: 1025 mL    Total NET: 625 mL      MEDICATIONS  (STANDING):  enoxaparin Injectable 40 milliGRAM(s) SubCutaneous daily  lactated ringers. 1000 milliLiter(s) (135 mL/Hr) IV Continuous <Continuous>  piperacillin/tazobactam IVPB.. 3.375 Gram(s) IV Intermittent every 8 hours  sodium phosphate IVPB 15 milliMole(s) IV Intermittent once    MEDICATIONS  (PRN):  acetaminophen   Tablet .. 975 milliGRAM(s) Oral every 6 hours PRN Mild Pain (1 - 3)  ketorolac   Injectable 15 milliGRAM(s) IV Push every 6 hours PRN Moderate Pain (4 - 6)      LABS:                        14.2   18.46 )-----------( 291      ( 30 Nov 2019 06:20 )             42.4     11-30    135  |  99  |  8   ----------------------------<  112<H>  3.9   |  25  |  0.99    Ca    9.0      30 Nov 2019 06:20  Phos  2.0     11-30  Mg     1.9     11-30    TPro  8.5<H>  /  Alb  4.9  /  TBili  0.7  /  DBili  x   /  AST  33  /  ALT  38  /  AlkPhos  69  11-28    PT/INR - ( 28 Nov 2019 22:48 )   PT: 12.7 SEC;   INR: 1.11          PTT - ( 28 Nov 2019 22:48 )  PTT:31.3 SEC  LIVER FUNCTIONS - ( 28 Nov 2019 22:48 )  Alb: 4.9 g/dL / Pro: 8.5 g/dL / ALK PHOS: 69 u/L / ALT: 38 u/L / AST: 33 u/L / GGT: x           Urinalysis Basic - ( 29 Nov 2019 01:00 )    Color: YELLOW / Appearance: CLEAR / SG: > 1.040 / pH: 6.5  Gluc: NEGATIVE / Ketone: SMALL  / Bili: NEGATIVE / Urobili: TRACE   Blood: NEGATIVE / Protein: 50 / Nitrite: NEGATIVE   Leuk Esterase: NEGATIVE / RBC: 3-5 / WBC 3-5   Sq Epi: OCC / Non Sq Epi: x / Bacteria: NEGATIVE

## 2019-11-30 NOTE — PROGRESS NOTE ADULT - ASSESSMENT
39 year old male with sigmoid diverticulitis and a focus of intraperitoneal air with leukocytosis, pain improving with abx    - CLD  - Zosyn for abx coverage  - f/u cx  - Pain control as needed  - Lovenox for VTE ppx    B-Team Surgery #[id number]

## 2019-12-01 LAB
ANION GAP SERPL CALC-SCNC: 13 MMO/L — SIGNIFICANT CHANGE UP (ref 7–14)
BUN SERPL-MCNC: 10 MG/DL — SIGNIFICANT CHANGE UP (ref 7–23)
CALCIUM SERPL-MCNC: 9 MG/DL — SIGNIFICANT CHANGE UP (ref 8.4–10.5)
CHLORIDE SERPL-SCNC: 100 MMOL/L — SIGNIFICANT CHANGE UP (ref 98–107)
CO2 SERPL-SCNC: 23 MMOL/L — SIGNIFICANT CHANGE UP (ref 22–31)
CREAT SERPL-MCNC: 1.07 MG/DL — SIGNIFICANT CHANGE UP (ref 0.5–1.3)
GLUCOSE SERPL-MCNC: 87 MG/DL — SIGNIFICANT CHANGE UP (ref 70–99)
HCT VFR BLD CALC: 41 % — SIGNIFICANT CHANGE UP (ref 39–50)
HGB BLD-MCNC: 14 G/DL — SIGNIFICANT CHANGE UP (ref 13–17)
MAGNESIUM SERPL-MCNC: 2 MG/DL — SIGNIFICANT CHANGE UP (ref 1.6–2.6)
MCHC RBC-ENTMCNC: 31.1 PG — SIGNIFICANT CHANGE UP (ref 27–34)
MCHC RBC-ENTMCNC: 34.1 % — SIGNIFICANT CHANGE UP (ref 32–36)
MCV RBC AUTO: 91.1 FL — SIGNIFICANT CHANGE UP (ref 80–100)
NRBC # FLD: 0 K/UL — SIGNIFICANT CHANGE UP (ref 0–0)
PHOSPHATE SERPL-MCNC: 3.1 MG/DL — SIGNIFICANT CHANGE UP (ref 2.5–4.5)
PLATELET # BLD AUTO: 323 K/UL — SIGNIFICANT CHANGE UP (ref 150–400)
PMV BLD: 9.9 FL — SIGNIFICANT CHANGE UP (ref 7–13)
POTASSIUM SERPL-MCNC: 3.6 MMOL/L — SIGNIFICANT CHANGE UP (ref 3.5–5.3)
POTASSIUM SERPL-SCNC: 3.6 MMOL/L — SIGNIFICANT CHANGE UP (ref 3.5–5.3)
RBC # BLD: 4.5 M/UL — SIGNIFICANT CHANGE UP (ref 4.2–5.8)
RBC # FLD: 12.5 % — SIGNIFICANT CHANGE UP (ref 10.3–14.5)
SODIUM SERPL-SCNC: 136 MMOL/L — SIGNIFICANT CHANGE UP (ref 135–145)
WBC # BLD: 14.88 K/UL — HIGH (ref 3.8–10.5)
WBC # FLD AUTO: 14.88 K/UL — HIGH (ref 3.8–10.5)

## 2019-12-01 RX ORDER — POTASSIUM CHLORIDE 20 MEQ
20 PACKET (EA) ORAL
Refills: 0 | Status: COMPLETED | OUTPATIENT
Start: 2019-12-01 | End: 2019-12-01

## 2019-12-01 RX ORDER — VALACYCLOVIR 500 MG/1
1000 TABLET, FILM COATED ORAL
Refills: 0 | Status: DISCONTINUED | OUTPATIENT
Start: 2019-12-01 | End: 2019-12-03

## 2019-12-01 RX ORDER — VALACYCLOVIR 500 MG/1
1000 TABLET, FILM COATED ORAL
Refills: 0 | Status: DISCONTINUED | OUTPATIENT
Start: 2019-12-01 | End: 2019-12-01

## 2019-12-01 RX ADMIN — Medication 975 MILLIGRAM(S): at 16:18

## 2019-12-01 RX ADMIN — PIPERACILLIN AND TAZOBACTAM 25 GRAM(S): 4; .5 INJECTION, POWDER, LYOPHILIZED, FOR SOLUTION INTRAVENOUS at 23:50

## 2019-12-01 RX ADMIN — Medication 975 MILLIGRAM(S): at 17:00

## 2019-12-01 RX ADMIN — Medication 975 MILLIGRAM(S): at 23:50

## 2019-12-01 RX ADMIN — PIPERACILLIN AND TAZOBACTAM 25 GRAM(S): 4; .5 INJECTION, POWDER, LYOPHILIZED, FOR SOLUTION INTRAVENOUS at 07:10

## 2019-12-01 RX ADMIN — Medication 975 MILLIGRAM(S): at 07:55

## 2019-12-01 RX ADMIN — Medication 20 MILLIEQUIVALENT(S): at 16:14

## 2019-12-01 RX ADMIN — VALACYCLOVIR 1000 MILLIGRAM(S): 500 TABLET, FILM COATED ORAL at 23:50

## 2019-12-01 RX ADMIN — Medication 975 MILLIGRAM(S): at 07:11

## 2019-12-01 RX ADMIN — ENOXAPARIN SODIUM 40 MILLIGRAM(S): 100 INJECTION SUBCUTANEOUS at 11:11

## 2019-12-01 RX ADMIN — Medication 975 MILLIGRAM(S): at 00:50

## 2019-12-01 RX ADMIN — PIPERACILLIN AND TAZOBACTAM 25 GRAM(S): 4; .5 INJECTION, POWDER, LYOPHILIZED, FOR SOLUTION INTRAVENOUS at 16:15

## 2019-12-01 RX ADMIN — Medication 20 MILLIEQUIVALENT(S): at 13:24

## 2019-12-01 NOTE — PROGRESS NOTE ADULT - ASSESSMENT
Assessment	  39 year old male with sigmoid diverticulitis and a focus of intraperitoneal air with leukocytosis, pain improving with abx    - c/w CLD  - c/w Zosyn for abx coverage  - f/u cx  - Pain control as needed  - Lovenox for VTE ppx    [facility]-Team Surgery #[id number]

## 2019-12-01 NOTE — PROGRESS NOTE ADULT - SUBJECTIVE AND OBJECTIVE BOX
Surgery Progress Note  Patient is a 39y old  Male who presents with a chief complaint of sigmoid diverticulitis (30 Nov 2019 09:44)      SUBJECTIVE: Patient seen and examined at bedside with surgical team, patient without complaints.   Having mild nausea, but no vomiting. Tolerating CLD. Having BM and flatus   Continues to have LLQ abdominal pain.        Vital Signs Last 24 Hrs  T(C): 36.9 (01 Dec 2019 10:25), Max: 37 (30 Nov 2019 22:28)  T(F): 98.5 (01 Dec 2019 10:25), Max: 98.6 (30 Nov 2019 22:28)  HR: 88 (01 Dec 2019 10:25) (83 - 88)  BP: 114/60 (01 Dec 2019 10:25) (106/70 - 149/97)  BP(mean): --  RR: 18 (01 Dec 2019 10:25) (16 - 18)  SpO2: 98% (01 Dec 2019 10:25) (96% - 99%)    Physical Exam  Constitutional: NAD  Respiratory: breathing comfortably on RA  Abd: soft, ND, LLQ and suprapubic TTP  Ext: moving all 4 ext spontaneously     I&O's Detail    30 Nov 2019 07:01  -  01 Dec 2019 07:00  --------------------------------------------------------  IN:    IV PiggyBack: 200 mL    lactated ringers.: 270 mL  Total IN: 470 mL    OUT:    Voided: 900 mL  Total OUT: 900 mL    Total NET: -430 mL      MEDICATIONS  (STANDING):  enoxaparin Injectable 40 milliGRAM(s) SubCutaneous daily  piperacillin/tazobactam IVPB.. 3.375 Gram(s) IV Intermittent every 8 hours    MEDICATIONS  (PRN):  acetaminophen   Tablet .. 975 milliGRAM(s) Oral every 6 hours PRN Mild Pain (1 - 3)  ketorolac   Injectable 15 milliGRAM(s) IV Push every 6 hours PRN Moderate Pain (4 - 6)      LABS:                        14.0   14.88 )-----------( 323      ( 01 Dec 2019 05:15 )             41.0     12-01    136  |  100  |  10  ----------------------------<  87  3.6   |  23  |  1.07    Ca    9.0      01 Dec 2019 05:15  Phos  3.1     12-01  Mg     2.0     12-01

## 2019-12-02 ENCOUNTER — TRANSCRIPTION ENCOUNTER (OUTPATIENT)
Age: 39
End: 2019-12-02

## 2019-12-02 LAB
ANION GAP SERPL CALC-SCNC: 13 MMO/L — SIGNIFICANT CHANGE UP (ref 7–14)
BUN SERPL-MCNC: 8 MG/DL — SIGNIFICANT CHANGE UP (ref 7–23)
C DIFF TOX GENS STL QL NAA+PROBE: SIGNIFICANT CHANGE UP
CALCIUM SERPL-MCNC: 9.5 MG/DL — SIGNIFICANT CHANGE UP (ref 8.4–10.5)
CHLORIDE SERPL-SCNC: 99 MMOL/L — SIGNIFICANT CHANGE UP (ref 98–107)
CO2 SERPL-SCNC: 24 MMOL/L — SIGNIFICANT CHANGE UP (ref 22–31)
CREAT SERPL-MCNC: 1.02 MG/DL — SIGNIFICANT CHANGE UP (ref 0.5–1.3)
GLUCOSE BLDC GLUCOMTR-MCNC: 119 MG/DL — HIGH (ref 70–99)
GLUCOSE SERPL-MCNC: 105 MG/DL — HIGH (ref 70–99)
HCT VFR BLD CALC: 42.2 % — SIGNIFICANT CHANGE UP (ref 39–50)
HGB BLD-MCNC: 14.2 G/DL — SIGNIFICANT CHANGE UP (ref 13–17)
MAGNESIUM SERPL-MCNC: 2 MG/DL — SIGNIFICANT CHANGE UP (ref 1.6–2.6)
MCHC RBC-ENTMCNC: 30.1 PG — SIGNIFICANT CHANGE UP (ref 27–34)
MCHC RBC-ENTMCNC: 33.6 % — SIGNIFICANT CHANGE UP (ref 32–36)
MCV RBC AUTO: 89.4 FL — SIGNIFICANT CHANGE UP (ref 80–100)
NRBC # FLD: 0 K/UL — SIGNIFICANT CHANGE UP (ref 0–0)
PHOSPHATE SERPL-MCNC: 3.5 MG/DL — SIGNIFICANT CHANGE UP (ref 2.5–4.5)
PLATELET # BLD AUTO: 408 K/UL — HIGH (ref 150–400)
PMV BLD: 9.6 FL — SIGNIFICANT CHANGE UP (ref 7–13)
POTASSIUM SERPL-MCNC: 3.7 MMOL/L — SIGNIFICANT CHANGE UP (ref 3.5–5.3)
POTASSIUM SERPL-SCNC: 3.7 MMOL/L — SIGNIFICANT CHANGE UP (ref 3.5–5.3)
RBC # BLD: 4.72 M/UL — SIGNIFICANT CHANGE UP (ref 4.2–5.8)
RBC # FLD: 12.6 % — SIGNIFICANT CHANGE UP (ref 10.3–14.5)
SODIUM SERPL-SCNC: 136 MMOL/L — SIGNIFICANT CHANGE UP (ref 135–145)
WBC # BLD: 12.92 K/UL — HIGH (ref 3.8–10.5)
WBC # FLD AUTO: 12.92 K/UL — HIGH (ref 3.8–10.5)

## 2019-12-02 PROCEDURE — 99231 SBSQ HOSP IP/OBS SF/LOW 25: CPT

## 2019-12-02 RX ORDER — SODIUM CHLORIDE 9 MG/ML
1000 INJECTION, SOLUTION INTRAVENOUS
Refills: 0 | Status: DISCONTINUED | OUTPATIENT
Start: 2019-12-02 | End: 2019-12-02

## 2019-12-02 RX ORDER — ACETAMINOPHEN 500 MG
3 TABLET ORAL
Qty: 0 | Refills: 0 | DISCHARGE
Start: 2019-12-02

## 2019-12-02 RX ADMIN — PIPERACILLIN AND TAZOBACTAM 25 GRAM(S): 4; .5 INJECTION, POWDER, LYOPHILIZED, FOR SOLUTION INTRAVENOUS at 23:01

## 2019-12-02 RX ADMIN — Medication 975 MILLIGRAM(S): at 16:46

## 2019-12-02 RX ADMIN — ENOXAPARIN SODIUM 40 MILLIGRAM(S): 100 INJECTION SUBCUTANEOUS at 11:47

## 2019-12-02 RX ADMIN — PIPERACILLIN AND TAZOBACTAM 25 GRAM(S): 4; .5 INJECTION, POWDER, LYOPHILIZED, FOR SOLUTION INTRAVENOUS at 16:46

## 2019-12-02 RX ADMIN — VALACYCLOVIR 1000 MILLIGRAM(S): 500 TABLET, FILM COATED ORAL at 07:13

## 2019-12-02 RX ADMIN — PIPERACILLIN AND TAZOBACTAM 25 GRAM(S): 4; .5 INJECTION, POWDER, LYOPHILIZED, FOR SOLUTION INTRAVENOUS at 07:13

## 2019-12-02 RX ADMIN — Medication 975 MILLIGRAM(S): at 17:30

## 2019-12-02 RX ADMIN — VALACYCLOVIR 1000 MILLIGRAM(S): 500 TABLET, FILM COATED ORAL at 17:43

## 2019-12-02 RX ADMIN — SODIUM CHLORIDE 100 MILLILITER(S): 9 INJECTION, SOLUTION INTRAVENOUS at 00:13

## 2019-12-02 NOTE — PROGRESS NOTE ADULT - ASSESSMENT
39 year old male with sigmoid diverticulitis and a focus of intraperitoneal air with leukocytosis, pain improving with abx, now tolerating clears    - advance to regular diet  - c/w Zosyn for abx coverage  - Pain control as needed  - Lovenox for VTE ppx  - DC today if tolerates regular with a total of 14 days of ABX    B-Team Surgery #[id number]

## 2019-12-02 NOTE — CONSULT NOTE ADULT - SUBJECTIVE AND OBJECTIVE BOX
COLORECTAL SURGERY CONSULT NOTE    39M presented with first episode of Hinchey II diverticulitis, colorectal surgery consulted for management. Patient currently tolerating clears with minimal discomfort. Initial presentation with significant tenderness and leukocytosis and CT scan with foci of air surrounding and inflamed sigmoid. Patient generally healthy with no surgery in the past.       HPI:  Patient is a 39 year old male with no PMH who is presenting with abdominal pain that started three days ago and its located in the lower midline abdomen.  The pain went away this morning, but then came back worse than before after having a BM.  He has been having fevers and chills at home and some nausea but no vomiting.  He has never had these symptoms before and he has never had a colonoscopy.  In the ED he came in with a temperature of 103.2, HR of 101, WBC of 14.8, and a lactate of 3.8.  After 1L bolus, lactate was 2.6 and he then received a second liter.      CT scan showed sigmoid diverticulitis with foci of intraperitoneal air but no fluid collection. (29 Nov 2019 03:59)      PAST MEDICAL & SURGICAL HISTORY:  No pertinent past medical history  No significant past surgical history: ankle surgery with hardware L    [  ] No significant past history as reviewed with the patient and family    FAMILY HISTORY:    [  ] Family history not pertinent as reviewed with the patient and family    SOCIAL HISTORY:    MEDICATIONS  (STANDING):  enoxaparin Injectable 40 milliGRAM(s) SubCutaneous daily  piperacillin/tazobactam IVPB.. 3.375 Gram(s) IV Intermittent every 8 hours  valACYclovir 1000 milliGRAM(s) Oral two times a day    MEDICATIONS  (PRN):  acetaminophen   Tablet .. 975 milliGRAM(s) Oral every 6 hours PRN Mild Pain (1 - 3)    Allergies    No Known Allergies    Intolerances        Vital Signs Last 24 Hrs  T(C): 36.9 (02 Dec 2019 09:00), Max: 37.1 (01 Dec 2019 22:38)  T(F): 98.4 (02 Dec 2019 09:00), Max: 98.8 (01 Dec 2019 22:38)  HR: 89 (02 Dec 2019 09:00) (82 - 94)  BP: 146/105 (02 Dec 2019 09:00) (114/60 - 146/105)  BP(mean): --  RR: 18 (02 Dec 2019 09:00) (16 - 18)  SpO2: 99% (02 Dec 2019 09:00) (96% - 99%)  Daily     Daily                             14.2   12.92 )-----------( 408      ( 02 Dec 2019 06:13 )             42.2     12-02    136  |  99  |  8   ----------------------------<  105<H>  3.7   |  24  |  1.02    Ca    9.5      02 Dec 2019 06:13  Phos  3.5     12-02  Mg     2.0     12-02            IMAGING STUDIES: COLORECTAL SURGERY CONSULT NOTE    39M presented with first episode of Hinchey II diverticulitis, colorectal surgery consulted for management. Patient currently tolerating clears with minimal discomfort. Patient had some diarrhea overnight, c.diff tested and negative. Initial presentation with significant tenderness and leukocytosis and CT scan with foci of air surrounding and inflamed sigmoid. Patient generally healthy with no surgery in the past.       HPI:  Patient is a 39 year old male with no PMH who is presenting with abdominal pain that started three days ago and its located in the lower midline abdomen.  The pain went away this morning, but then came back worse than before after having a BM.  He has been having fevers and chills at home and some nausea but no vomiting.  He has never had these symptoms before and he has never had a colonoscopy.  In the ED he came in with a temperature of 103.2, HR of 101, WBC of 14.8, and a lactate of 3.8.  After 1L bolus, lactate was 2.6 and he then received a second liter.      CT scan showed sigmoid diverticulitis with foci of intraperitoneal air but no fluid collection. (29 Nov 2019 03:59)      PAST MEDICAL & SURGICAL HISTORY:  No pertinent past medical history  No significant past surgical history: ankle surgery with hardware L    FAMILY HISTORY:    [  ] Family history not pertinent as reviewed with the patient and family    SOCIAL HISTORY:    MEDICATIONS  (STANDING):  enoxaparin Injectable 40 milliGRAM(s) SubCutaneous daily  piperacillin/tazobactam IVPB.. 3.375 Gram(s) IV Intermittent every 8 hours  valACYclovir 1000 milliGRAM(s) Oral two times a day    MEDICATIONS  (PRN):  acetaminophen   Tablet .. 975 milliGRAM(s) Oral every 6 hours PRN Mild Pain (1 - 3)    Allergies    No Known Allergies    Intolerances    PHYSICAL EXAM    Vital Signs Last 24 Hrs  T(C): 36.9 (02 Dec 2019 09:00), Max: 37.1 (01 Dec 2019 22:38)  T(F): 98.4 (02 Dec 2019 09:00), Max: 98.8 (01 Dec 2019 22:38)  HR: 89 (02 Dec 2019 09:00) (82 - 94)  BP: 146/105 (02 Dec 2019 09:00) (114/60 - 146/105)  BP(mean): --  RR: 18 (02 Dec 2019 09:00) (16 - 18)  SpO2: 99% (02 Dec 2019 09:00) (96% - 99%)  Daily     Daily   General: WN/WD NAD  Neurology: A&Ox3, nonfocal, COVARRUBIAS x 4  Head:  Normocephalic, atraumatic  ENT:  Mucosa moist, no ulcerations  Neck:  Supple, no sinuses or palpable masses  Lymphatic:  No palpable cervical, supraclavicular, axillary or inguinal adenopathy  Respiratory: CTA B/L  CV: RRR, S1S2, no murmur  Abdominal: Softly distended, mild LLQ tenderness  MSK: No edema, + peripheral pulses, FROM all 4 extremity                            14.2   12.92 )-----------( 408      ( 02 Dec 2019 06:13 )             42.2     12-02    136  |  99  |  8   ----------------------------<  105<H>  3.7   |  24  |  1.02    Ca    9.5      02 Dec 2019 06:13  Phos  3.5     12-02  Mg     2.0     12-02            IMAGING STUDIES:

## 2019-12-02 NOTE — DISCHARGE NOTE PROVIDER - CARE PROVIDER_API CALL
Curtis Bassett)  ColonRectal Surgery; Surgery  [facility], [address] [place] [address]  Phone: [phone]  Fax: [phone]  Follow Up Time: 1 week

## 2019-12-02 NOTE — DISCHARGE NOTE PROVIDER - NSDCCPCAREPLAN_GEN_ALL_CORE_FT
PRINCIPAL DISCHARGE DIAGNOSIS  Diagnosis: Diverticulitis of large intestine with perforation, unspecified bleeding status  Assessment and Plan of Treatment:

## 2019-12-02 NOTE — CONSULT NOTE ADULT - ASSESSMENT
39M presented with first episode of Hinchey II diverticulitis, colorectal surgery consulted for management. Patient currently tolerating clears with minimal discomfort. Initial presentation with significant tenderness and leukocytosis and CT scan with foci of air surrounding and inflamed sigmoid. Patient generally healthy with no surgery in the past.       -advance diet as tolerated  -likely discharge home on antibiotics tomorrow if tolerating a diet  -patient with follow up with Dr. CONNIE Bassett as an outpatient  discussed with Dr. Bassett, to see patient    Teressa Patsy, PGY-4

## 2019-12-02 NOTE — DISCHARGE NOTE PROVIDER - HOSPITAL COURSE
Patient is a 39 year old male with no PMH who is presenting with abdominal pain that started three days ago and its located in the lower midline abdomen.  The pain went away this morning, but then came back worse than before after having a BM.  He has been having fevers and chills at home and some nausea but no vomiting.  He has never had these symptoms before and he has never had a colonoscopy.  In the ED he came in with a temperature of 103.2, HR of 101, WBC of 14.8, and a lactate of 3.8.  After 1L bolus, lactate was 2.6 and he then received a second liter.          CT scan showed sigmoid diverticulitis with foci of intraperitoneal air but no fluid collection.        Pt admitted to general surgery service and managed nonoperatively with bowel rest, IV fluid hydration, and IV abx.  As pain resolved, diet was restarted and slowly advanced as tolerated.  Pt currently ambulating, voiding, tolerating a regular diet, without pain.  Per team and attending, pt is hemodynamically stable for discharge home to complete an antibiotic course and follow up as an outpatient.

## 2019-12-02 NOTE — PROGRESS NOTE ADULT - ATTENDING COMMENTS
I have reviewed the history, pertinent labs and imaging, and discussed the care with the B team residents and PA.    The active issues are:  1. resolving sigmoid diverticulitis    CDT negative.  Discharge home once tolerating oral diet.  Outpatient CRS f/u.    The Acute Care Surgery (B Team) Attending Group Practice:  Dr. Keli Woods, Dr. Erick Salcido, Dr. Morro Yang, Dr. Gino Graham, Dr. Nelson Adler    urgent issues - spectra [phone] or [phone]  nonurgent issues - [phone]  patient appointments or afterhours - [phone]

## 2019-12-02 NOTE — PROGRESS NOTE ADULT - SUBJECTIVE AND OBJECTIVE BOX
Morning Surgical Progress Note  Patient is a 39y old  Male who presents with a chief complaint of sigmoid diverticulitis (01 Dec 2019 11:48)      SUBJECTIVE: Patient seen and examined at bedside with surgical team, patient without complaints.     PAST MEDICAL & SURGICAL HISTORY:  No pertinent past medical history  No significant past surgical history: ankle surgery with hardware L    FAMILY HISTORY:    REVIEW OF SYSTEMS:    CONSTITUTIONAL: No weakness, fevers or chills  EYES/ENT: No visual changes;  No vertigo or throat pain   NECK: No pain or stiffness  RESPIRATORY: No cough, wheezing, hemoptysis; No shortness of breath  CARDIOVASCULAR: No chest pain or palpitations  GASTROINTESTINAL: No abdominal or epigastric pain. No nausea, vomiting, or hematemesis; No diarrhea or constipation. No melena or hematochezia.  GENITOURINARY: No dysuria, frequency or hematuria  NEUROLOGICAL: No numbness or weakness  SKIN: No itching, rashes    Vital Signs Last 24 Hrs  T(C): 36.9 (02 Dec 2019 09:00), Max: 37.1 (01 Dec 2019 22:38)  T(F): 98.4 (02 Dec 2019 09:00), Max: 98.8 (01 Dec 2019 22:38)  HR: 89 (02 Dec 2019 09:00) (82 - 94)  BP: 146/105 (02 Dec 2019 09:00) (114/60 - 146/105)  BP(mean): --  RR: 18 (02 Dec 2019 09:00) (16 - 18)  SpO2: 99% (02 Dec 2019 09:00) (96% - 99%)I&O's Detail    01 Dec 2019 07:01  -  02 Dec 2019 07:00  --------------------------------------------------------  IN:    dextrose 5% + sodium chloride 0.45%: 200 mL    IV PiggyBack: 300 mL  Total IN: 500 mL    OUT:    Voided: 1700 mL  Total OUT: 1700 mL    Total NET: -1200 mL        Medications  MEDICATIONS  (STANDING):  enoxaparin Injectable 40 milliGRAM(s) SubCutaneous daily  piperacillin/tazobactam IVPB.. 3.375 Gram(s) IV Intermittent every 8 hours  valACYclovir 1000 milliGRAM(s) Oral two times a day    MEDICATIONS  (PRN):  acetaminophen   Tablet .. 975 milliGRAM(s) Oral every 6 hours PRN Mild Pain (1 - 3)  ketorolac   Injectable 15 milliGRAM(s) IV Push every 6 hours PRN Moderate Pain (4 - 6)      Physical Exam  Constitutional: A&Ox3, NAD  Eyes: Scleras clear, PERRLA/ EOMI, Gross vision intact  Gastrointestinal: Soft mildly tender LLQ, nondistended  Extremities: Moving all extremities, no edema  Skin: No Rashes, Hematoma, Ecchymosis    LABS:                        14.2   12.92 )-----------( 408      ( 02 Dec 2019 06:13 )             42.2     12-02    136  |  99  |  8   ----------------------------<  105<H>  3.7   |  24  |  1.02    Ca    9.5      02 Dec 2019 06:13  Phos  3.5     12-02  Mg     2.0     12-02

## 2019-12-02 NOTE — DISCHARGE NOTE PROVIDER - NSDCFUADDINST_GEN_ALL_CORE_FT
ACTIVITY: You may return to your usual level of physical activity.  NOTIFY YOUR SURGEON IF: You have any fever (over 100.4 F) or chills, persistent nausea/vomiting, persistent diarrhea, or if your pain is not controlled on your discharge pain medications.  FOLLOW-UP: Please follow up with your primary care physician in one week regarding your hospitalization.  Please follow up with Dr. Bassett in one week.  Call to schedule an appointment.  Take all antibiotics as prescribed.

## 2019-12-02 NOTE — DISCHARGE NOTE PROVIDER - NSDCMRMEDTOKEN_GEN_ALL_CORE_FT
acetaminophen 325 mg oral tablet: 3 tab(s) orally every 6 hours, As needed, Mild Pain (1 - 3)  amoxicillin-clavulanate 875 mg-125 mg oral tablet: 1 tab(s) orally every 12 hours

## 2019-12-03 ENCOUNTER — TRANSCRIPTION ENCOUNTER (OUTPATIENT)
Age: 39
End: 2019-12-03

## 2019-12-03 VITALS — DIASTOLIC BLOOD PRESSURE: 92 MMHG | SYSTOLIC BLOOD PRESSURE: 135 MMHG

## 2019-12-03 LAB
ANION GAP SERPL CALC-SCNC: 16 MMO/L — HIGH (ref 7–14)
BUN SERPL-MCNC: 9 MG/DL — SIGNIFICANT CHANGE UP (ref 7–23)
CALCIUM SERPL-MCNC: 9.8 MG/DL — SIGNIFICANT CHANGE UP (ref 8.4–10.5)
CHLORIDE SERPL-SCNC: 97 MMOL/L — LOW (ref 98–107)
CO2 SERPL-SCNC: 25 MMOL/L — SIGNIFICANT CHANGE UP (ref 22–31)
CREAT SERPL-MCNC: 1.01 MG/DL — SIGNIFICANT CHANGE UP (ref 0.5–1.3)
GLUCOSE SERPL-MCNC: 88 MG/DL — SIGNIFICANT CHANGE UP (ref 70–99)
HCT VFR BLD CALC: 42.8 % — SIGNIFICANT CHANGE UP (ref 39–50)
HGB BLD-MCNC: 14.7 G/DL — SIGNIFICANT CHANGE UP (ref 13–17)
MAGNESIUM SERPL-MCNC: 2 MG/DL — SIGNIFICANT CHANGE UP (ref 1.6–2.6)
MCHC RBC-ENTMCNC: 30.7 PG — SIGNIFICANT CHANGE UP (ref 27–34)
MCHC RBC-ENTMCNC: 34.3 % — SIGNIFICANT CHANGE UP (ref 32–36)
MCV RBC AUTO: 89.4 FL — SIGNIFICANT CHANGE UP (ref 80–100)
NRBC # FLD: 0 K/UL — SIGNIFICANT CHANGE UP (ref 0–0)
PHOSPHATE SERPL-MCNC: 3.8 MG/DL — SIGNIFICANT CHANGE UP (ref 2.5–4.5)
PLATELET # BLD AUTO: 437 K/UL — HIGH (ref 150–400)
PMV BLD: 9.3 FL — SIGNIFICANT CHANGE UP (ref 7–13)
POTASSIUM SERPL-MCNC: 4.1 MMOL/L — SIGNIFICANT CHANGE UP (ref 3.5–5.3)
POTASSIUM SERPL-SCNC: 4.1 MMOL/L — SIGNIFICANT CHANGE UP (ref 3.5–5.3)
RBC # BLD: 4.79 M/UL — SIGNIFICANT CHANGE UP (ref 4.2–5.8)
RBC # FLD: 12.7 % — SIGNIFICANT CHANGE UP (ref 10.3–14.5)
SODIUM SERPL-SCNC: 138 MMOL/L — SIGNIFICANT CHANGE UP (ref 135–145)
WBC # BLD: 12.66 K/UL — HIGH (ref 3.8–10.5)
WBC # FLD AUTO: 12.66 K/UL — HIGH (ref 3.8–10.5)

## 2019-12-03 PROCEDURE — 99238 HOSP IP/OBS DSCHRG MGMT 30/<: CPT

## 2019-12-03 RX ADMIN — PIPERACILLIN AND TAZOBACTAM 25 GRAM(S): 4; .5 INJECTION, POWDER, LYOPHILIZED, FOR SOLUTION INTRAVENOUS at 07:09

## 2019-12-03 RX ADMIN — VALACYCLOVIR 1000 MILLIGRAM(S): 500 TABLET, FILM COATED ORAL at 07:10

## 2019-12-03 NOTE — PROGRESS NOTE ADULT - SUBJECTIVE AND OBJECTIVE BOX
Morning Surgical Progress Note  Patient is a 39y old  Male who presents with a chief complaint of sigmoid diverticulitis (02 Dec 2019 09:33)      SUBJECTIVE: Patient seen and examined at bedside with surgical team, patient without complaints this am, he is tolerating a diet, and would like to go home    PAST MEDICAL & SURGICAL HISTORY:  No pertinent past medical history  No significant past surgical history: ankle surgery with hardware L    FAMILY HISTORY:    REVIEW OF SYSTEMS:    CONSTITUTIONAL: No weakness, fevers or chills  EYES/ENT: No visual changes;  No vertigo or throat pain   NECK: No pain or stiffness  RESPIRATORY: No cough, wheezing, hemoptysis; No shortness of breath  CARDIOVASCULAR: No chest pain or palpitations  GASTROINTESTINAL: No abdominal or epigastric pain. No nausea, vomiting, or hematemesis; No diarrhea or constipation. No melena or hematochezia.  GENITOURINARY: No dysuria, frequency or hematuria  NEUROLOGICAL: No numbness or weakness  SKIN: No itching, rashes    Vital Signs Last 24 Hrs  T(C): 36.8 (03 Dec 2019 09:42), Max: 36.8 (03 Dec 2019 09:42)  T(F): 98.3 (03 Dec 2019 09:42), Max: 98.3 (03 Dec 2019 09:42)  HR: 86 (03 Dec 2019 09:42) (72 - 90)  BP: 139/99 (03 Dec 2019 09:42) (137/88 - 151/98)  BP(mean): --  RR: 18 (03 Dec 2019 09:42) (16 - 19)  SpO2: 96% (03 Dec 2019 09:42) (96% - 100%)I&O's Detail    02 Dec 2019 07:01  -  03 Dec 2019 07:00  --------------------------------------------------------  IN:    IV PiggyBack: 300 mL    Oral Fluid: 480 mL  Total IN: 780 mL    OUT:    Voided: 600 mL  Total OUT: 600 mL    Total NET: 180 mL      03 Dec 2019 07:01  -  03 Dec 2019 10:44  --------------------------------------------------------  IN:  Total IN: 0 mL    OUT:  Total OUT: 0 mL    Total NET: 0 mL        Medications  MEDICATIONS  (STANDING):  enoxaparin Injectable 40 milliGRAM(s) SubCutaneous daily  piperacillin/tazobactam IVPB.. 3.375 Gram(s) IV Intermittent every 8 hours  valACYclovir 1000 milliGRAM(s) Oral two times a day    MEDICATIONS  (PRN):  acetaminophen   Tablet .. 975 milliGRAM(s) Oral every 6 hours PRN Mild Pain (1 - 3)      Physical Exam  Constitutional: A&Ox3, NAD  Gastrointestinal: Soft nontender, nondistended  Extremities: Moving all extremities, no edema  Skin: No Rashes, Hematoma, Ecchymosis    LABS:                        14.7   12.66 )-----------( 437      ( 03 Dec 2019 06:34 )             42.8     12-03    138  |  97<L>  |  9   ----------------------------<  88  4.1   |  25  |  1.01    Ca    9.8      03 Dec 2019 06:38  Phos  3.8     12-03  Mg     2.0     12-03

## 2019-12-03 NOTE — PROGRESS NOTE ADULT - ATTENDING COMMENTS
I have reviewed the history, pertinent labs and imaging, and discussed the care with the B team residents and PA.    The active issues are:  1. resolving sigmoid diverticulitis    CDT negative.  Discharge home once tolerating oral diet.  Outpatient CRS f/u.    The Acute Care Surgery (B Team) Attending Group Practice:  Dr. Keli Woods, Dr. Erick Salcido, Dr. Morro Yang, Dr. Gino Graham, Dr. Nelson Adler    urgent issues - spectra [phone] or [phone]  nonurgent issues - [phone]  patient appointments or afterhours - [phone] .

## 2019-12-03 NOTE — PROGRESS NOTE ADULT - ASSESSMENT
39 year old male with sigmoid diverticulitis and a focus of intraperitoneal air with leukocytosis, pain improving with abx, now tolerating regular diet    - continue regular diet  - Lovenox for VTE ppx  - DC today with a total of 14 days of ABX  - follow up with Dr Serjio GALARZA-Team Surgery #[id number]

## 2019-12-03 NOTE — DISCHARGE NOTE NURSING/CASE MANAGEMENT/SOCIAL WORK - PATIENT PORTAL LINK FT
You can access the FollowMyHealth Patient Portal offered by [facility] by registering at the following website: [email or web address]. By joining [employer]’s FollowMyHealth portal, you will also be able to view your health information using other applications (apps) compatible with our system.

## 2019-12-04 LAB
BACTERIA BLD CULT: SIGNIFICANT CHANGE UP
BACTERIA BLD CULT: SIGNIFICANT CHANGE UP

## 2019-12-06 PROBLEM — Z78.9 OTHER SPECIFIED HEALTH STATUS: Chronic | Status: ACTIVE | Noted: 2019-11-28

## 2019-12-10 PROBLEM — Z00.00 ENCOUNTER FOR PREVENTIVE HEALTH EXAMINATION: Status: ACTIVE | Noted: 2019-12-10

## 2019-12-17 ENCOUNTER — APPOINTMENT (OUTPATIENT)
Dept: COLORECTAL SURGERY | Facility: CLINIC | Age: 39
End: 2019-12-17
Payer: MEDICAID

## 2019-12-17 DIAGNOSIS — K57.32 DIVERTICULITIS OF LARGE INTESTINE W/OUT PERFORATION OR ABSCESS W/OUT BLEEDING: ICD-10-CM

## 2019-12-17 DIAGNOSIS — Z72.89 OTHER PROBLEMS RELATED TO LIFESTYLE: ICD-10-CM

## 2019-12-17 DIAGNOSIS — F17.200 NICOTINE DEPENDENCE, UNSPECIFIED, UNCOMPLICATED: ICD-10-CM

## 2019-12-17 DIAGNOSIS — Z83.3 FAMILY HISTORY OF DIABETES MELLITUS: ICD-10-CM

## 2019-12-17 PROCEDURE — 99243 OFF/OP CNSLTJ NEW/EST LOW 30: CPT

## 2019-12-17 RX ORDER — AMOXICILLIN AND CLAVULANATE POTASSIUM 875; 125 MG/1; MG/1
875-125 TABLET, COATED ORAL
Qty: 28 | Refills: 2 | Status: ACTIVE | COMMUNITY
Start: 2019-12-17 | End: 1900-01-01

## 2019-12-17 NOTE — REVIEW OF SYSTEMS
[As Noted in HPI] : as noted in HPI [Negative] : Endocrine [Shortness Of Breath] : no shortness of breath [Chest Pain] : no chest pain [Easy Bruising] : no tendency for easy bruising [Easy Bleeding] : no tendency for easy bleeding

## 2019-12-17 NOTE — HISTORY OF PRESENT ILLNESS
[FreeTextEntry1] : 40yo WM presented to [facility] ER on 11.29 with abdominal pain/difficulty having BM.  Denies fever.  CT scan revealed SC diverticulitis.  Hospitalized form IV ABX.  Discharged on Augmentin (last 12.16.19).  Advised to schedule consultation.\par \par Presently mild suprapubic discomfort.  Daily BM.  Denies dietary restrictions.  Patient denies urinary complaints. No pneumaturia. No fevers or chills. Tolerating diet. Mild discomfort with food. No nausea or vomiting. No family history of colon cancer or inflammatory bowel disease.\par \par Denies prior colonoscopy.

## 2019-12-17 NOTE — CONSULT LETTER
[Dear  ___] : Dear  [unfilled], [Consult Letter:] : I had the pleasure of evaluating your patient, [unfilled]. [Sincerely,] : Sincerely, [Consult Closing:] : Thank you very much for allowing me to participate in the care of this patient.  If you have any questions, please do not hesitate to contact me. [Please see my note below.] : Please see my note below. [FreeTextEntry2] : Erick Cannon [FreeTextEntry3] : Curtis Bassett MD FACS\par Chief Colon and Rectal Surgery\par [facility]

## 2019-12-17 NOTE — ASSESSMENT
[FreeTextEntry1] : Perforated diverticulitis\par -Perforated diverticulitis with some residual symptoms currently off antibiotics\par -Will restart antibiotics\par -Repeat imaging\par -We'll discuss next treatment options after evaluating for active disease\par -Low-residue diet\par -Patient will require elective colonoscopy when active disease has resolved

## 2019-12-17 NOTE — PHYSICAL EXAM
[Normal Rate and Rhythm] : normal rate and rhythm [No Rash or Lesion] : No rash or lesion [Alert] : alert [Oriented to Person] : oriented to person [Oriented to Time] : oriented to time [Oriented to Place] : oriented to place [Calm] : calm [JVD] : no jugular venous distention  [de-identified] : No apparent distress [de-identified] : Soft mild hypogastric discomfort with deep palpation [Wheezing] : no wheezing was heard [de-identified] : Extraocular motion intact [de-identified] : Moves all extremities no edema

## 2019-12-31 ENCOUNTER — OTHER (OUTPATIENT)
Age: 39
End: 2019-12-31

## 2020-01-23 ENCOUNTER — APPOINTMENT (OUTPATIENT)
Dept: COLORECTAL SURGERY | Facility: CLINIC | Age: 40
End: 2020-01-23

## 2023-12-30 NOTE — ED PROVIDER NOTE - NS ED MD DISPO ISOLATION TYPES
Encounter Date: 12/30/2023       History       Chief Complaint  Patient presents with  · Diarrhea  · Weakness  · Fever  · Abdominal Pain    Pt in per EMS from home with reports of abdominal cramping, diarrhea, fever, and weakness onset 4 days pta.  Patient says that he has been having diarrhea which started on Thursday has been going on for last 3 days he has been having 3-4 episodes per day denies any abdominal pain denies any shortness a breath denies any nausea denies any vomiting          Review of patient's allergies indicates:  No Known Allergies  Past Medical History:   Diagnosis Date    BPH (benign prostatic hyperplasia)     Hypertension      Past Surgical History:   Procedure Laterality Date    HERNIA REPAIR       No family history on file.     Review of Systems   Constitutional:  Negative for activity change, appetite change, diaphoresis and fatigue.   HENT:  Negative for congestion, ear discharge, ear pain, hearing loss, postnasal drip, sinus pain and sore throat.    Eyes:  Negative for pain.   Respiratory:  Negative for apnea, choking and stridor.    Cardiovascular:  Negative for palpitations.   Gastrointestinal:  Positive for diarrhea. Negative for abdominal distention, abdominal pain and constipation.   Endocrine: Negative for cold intolerance and heat intolerance.   Genitourinary:  Negative for difficulty urinating, enuresis, frequency, penile discharge, penile pain, scrotal swelling and testicular pain.   Neurological:  Negative for dizziness, seizures, syncope, facial asymmetry, light-headedness and headaches.   Hematological:  Negative for adenopathy.   Psychiatric/Behavioral:  Negative for agitation, behavioral problems, decreased concentration and hallucinations. The patient is not hyperactive.        Physical Exam     Initial Vitals [12/30/23 1245]   BP Pulse Resp Temp SpO2   (!) 115/58 (!) 120 17 (!) 103.2 °F (39.6 °C) (!) 93 %      MAP       --         Physical Exam    Nursing note and vitals  reviewed.  Constitutional: He appears well-developed.   HENT:   Head: Normocephalic and atraumatic.   Eyes: Pupils are equal, round, and reactive to light.   Neck:   Normal range of motion.  Cardiovascular:  Normal rate, regular rhythm, normal heart sounds and intact distal pulses.           Pulmonary/Chest: No respiratory distress. He has no wheezes. He has no rales.   Abdominal: He exhibits no distension and no mass. There is no abdominal tenderness. There is no rebound and no guarding.   Musculoskeletal:         General: Normal range of motion.      Cervical back: Normal range of motion.     Neurological: He is alert.   Skin: Skin is warm.   Psychiatric: He has a normal mood and affect. Thought content normal.         ED Course   Procedures  Labs Reviewed   COMPREHENSIVE METABOLIC PANEL - Abnormal; Notable for the following components:       Result Value    Sodium Level 131 (*)     Carbon Dioxide 18 (*)     Glucose Level 156 (*)     Blood Urea Nitrogen 24.0 (*)     Creatinine 2.19 (*)     BUN/Creatinine Ratio 11 (*)     All other components within normal limits   CBC WITH DIFFERENTIAL - Abnormal; Notable for the following components:    Platelet 110 (*)     All other components within normal limits   URINALYSIS - Abnormal; Notable for the following components:    Color, UA Orange (*)     Appearance, UA SL CLOUDY (*)     Protein, UA 30 (*)     Blood, UA Small (*)     All other components within normal limits    Narrative:      URINE STABILITY IS 2 HOURS AT ROOM TEMP OR    SIX HOURS REFRIGERATED. PERFORMING TESTING ON    SPECIMENS GREATER THAN THIS AGE MAY AFFECT THE    FOLLOWING TESTS:    PH          SPECIFIC GRAVITY           BLOOD    CLARITY     BILIRUBIN               UROBILINOGEN   MANUAL DIFFERENTIAL - Abnormal; Notable for the following components:    Neutrophils % 81 (*)     Lymphs % 10 (*)     All other components within normal limits   URINALYSIS, MICROSCOPIC - Abnormal; Notable for the following  components:    Bacteria, UA Many (*)     WBC, UA 11-20 (*)     Squamous Epithelial Cells, UA Few (*)     All other components within normal limits   RAPID INFLUENZA A/B - Normal   SARS-COV-2 RNA AMPLIFICATION, QUAL - Normal   CULTURE, URINE   CBC W/ AUTO DIFFERENTIAL    Narrative:     The following orders were created for panel order CBC auto differential.  Procedure                               Abnormality         Status                     ---------                               -----------         ------                     CBC with Differential[[id number]]        Abnormal            Final result               Manual Differential[[id number]]         Abnormal            Final result                 Please view results for these tests on the individual orders.          Imaging Results    None          Medications   sodium chloride 0.9% bolus 1,000 mL 1,000 mL (1,000 mLs Intravenous New Bag 12/30/23 1615)   acetaminophen tablet 1,000 mg (1,000 mg Oral Given 12/30/23 1302)   0.9%  NaCl infusion (1,000 mLs Intravenous New Bag 12/30/23 1304)     Medical Decision Making  No leukocyte elevation however the urine was positive for WBCs about 11-20 I am going to treat him with Bactrim if the diarrhea is coming from Salmonella Shigella Campylobacter the Bactrim should cover that patient was offered hospital admission patient declined this can complications explained    Amount and/or Complexity of Data Reviewed  Labs: ordered.    Risk  OTC drugs.  Prescription drug management.                                      Clinical Impression:  Final diagnoses:  [A09] Diarrhea of infectious origin (Primary)          ED Disposition Condition    Discharge Stable          ED Prescriptions       Medication Sig Dispense Start Date End Date Auth. Provider    sulfamethoxazole-trimethoprim 800-160mg (BACTRIM DS) 800-160 mg Tab Take 1 tablet by mouth 2 (two) times daily. for 7 days 14 tablet 12/30/2023 1/6/2024 Lorna Gunter MD           Follow-up Information       Follow up With Specialties Details Why Contact Info    Brett Wilson III, MD Family Medicine In 1 day  [address] [place] [address]  [phone]               Lorna Gunter MD  12/30/23 1104     None